# Patient Record
Sex: MALE | Race: WHITE | NOT HISPANIC OR LATINO | Employment: FULL TIME | URBAN - METROPOLITAN AREA
[De-identification: names, ages, dates, MRNs, and addresses within clinical notes are randomized per-mention and may not be internally consistent; named-entity substitution may affect disease eponyms.]

---

## 2017-02-06 ENCOUNTER — HISTORICAL (OUTPATIENT)
Dept: ADMINISTRATIVE | Facility: HOSPITAL | Age: 67
End: 2017-02-06

## 2017-02-06 LAB
ALBUMIN SERPL-MCNC: 4.5 G/DL (ref 3.1–4.7)
ALP SERPL-CCNC: 55 IU/L (ref 40–104)
ALT (SGPT): 58 IU/L (ref 3–33)
AST SERPL-CCNC: 41 IU/L (ref 10–40)
BASOPHILS NFR BLD: 0 K/UL (ref 0–0.2)
BASOPHILS NFR BLD: 0.9 %
BILIRUB SERPL-MCNC: 0.8 MG/DL (ref 0.3–1)
BUN SERPL-MCNC: 12 MG/DL (ref 8–20)
CALCIUM SERPL-MCNC: 9.6 MG/DL (ref 7.7–10.4)
CHLORIDE: 105 MMOL/L (ref 98–110)
CO2 SERPL-SCNC: 28.8 MMOL/L (ref 22.8–31.6)
CREATININE: 0.8 MG/DL (ref 0.6–1.4)
CRP SERPL-MCNC: 0.06 MG/DL (ref 0–1.4)
EOSINOPHIL NFR BLD: 0.2 K/UL (ref 0–0.7)
EOSINOPHIL NFR BLD: 5.3 %
ERYTHROCYTE [DISTWIDTH] IN BLOOD BY AUTOMATED COUNT: 12.5 % (ref 11.7–14.9)
GLUCOSE: 83 MG/DL (ref 70–99)
GRAN #: 2.3 K/UL (ref 1.4–6.5)
GRAN%: 54.2 %
HCT VFR BLD AUTO: 45.3 % (ref 39–55)
HGB BLD-MCNC: 15.5 G/DL (ref 14–16)
IMMATURE GRANS (ABS): 0 K/UL (ref 0–1)
IMMATURE GRANULOCYTES: 0 %
LYMPH #: 1.3 K/UL (ref 1.2–3.4)
LYMPH%: 29.1 %
MCH RBC QN AUTO: 31.6 PG (ref 25–35)
MCHC RBC AUTO-ENTMCNC: 34.2 G/DL (ref 31–36)
MCV RBC AUTO: 92.3 FL (ref 80–100)
MONO #: 0.5 K/UL (ref 0.1–0.6)
MONO%: 10.5 %
NUCLEATED RBCS: 0 %
PLATELET # BLD AUTO: 217 K/UL (ref 140–440)
PMV BLD AUTO: 10 FL (ref 8.8–12.7)
POTASSIUM SERPL-SCNC: 3.9 MMOL/L (ref 3.5–5)
PROT SERPL-MCNC: 8.1 G/DL (ref 6–8.2)
RBC # BLD AUTO: 4.91 M/UL (ref 4.3–5.9)
SODIUM: 142 MMOL/L (ref 134–144)
WBC # BLD AUTO: 4.3 K/UL (ref 5–10)

## 2017-05-08 ENCOUNTER — HISTORICAL (OUTPATIENT)
Dept: ADMINISTRATIVE | Facility: HOSPITAL | Age: 67
End: 2017-05-08

## 2017-05-08 LAB
ALBUMIN SERPL-MCNC: 4.4 G/DL (ref 3.1–4.7)
ALP SERPL-CCNC: 50 IU/L (ref 40–104)
ALT (SGPT): 46 IU/L (ref 3–33)
AST SERPL-CCNC: 31 IU/L (ref 10–40)
BASOPHILS NFR BLD: 0 K/UL (ref 0–0.2)
BASOPHILS NFR BLD: 0.8 %
BILIRUB SERPL-MCNC: 0.8 MG/DL (ref 0.3–1)
BUN SERPL-MCNC: 12 MG/DL (ref 8–20)
CALCIUM SERPL-MCNC: 9.4 MG/DL (ref 7.7–10.4)
CHLORIDE: 107 MMOL/L (ref 98–110)
CO2 SERPL-SCNC: 27.8 MMOL/L (ref 22.8–31.6)
CREATININE: 0.75 MG/DL (ref 0.6–1.4)
CRP SERPL-MCNC: 0.07 MG/DL (ref 0–1.4)
EOSINOPHIL NFR BLD: 0.3 K/UL (ref 0–0.7)
EOSINOPHIL NFR BLD: 6.3 %
ERYTHROCYTE [DISTWIDTH] IN BLOOD BY AUTOMATED COUNT: 13 % (ref 11.7–14.9)
GLUCOSE: 88 MG/DL (ref 70–99)
GRAN #: 3.1 K/UL (ref 1.4–6.5)
GRAN%: 62.5 %
HCT VFR BLD AUTO: 45.5 % (ref 39–55)
HGB BLD-MCNC: 15.5 G/DL (ref 14–16)
IMMATURE GRANS (ABS): 0 K/UL (ref 0–1)
IMMATURE GRANULOCYTES: 0.2 %
LYMPH #: 1 K/UL (ref 1.2–3.4)
LYMPH%: 20.2 %
MCH RBC QN AUTO: 31.8 PG (ref 25–35)
MCHC RBC AUTO-ENTMCNC: 34.1 G/DL (ref 31–36)
MCV RBC AUTO: 93.2 FL (ref 80–100)
MONO #: 0.5 K/UL (ref 0.1–0.6)
MONO%: 10 %
NUCLEATED RBCS: 0 %
PLATELET # BLD AUTO: 230 K/UL (ref 140–440)
PMV BLD AUTO: 9.9 FL (ref 8.8–12.7)
POTASSIUM SERPL-SCNC: 4.2 MMOL/L (ref 3.5–5)
PROT SERPL-MCNC: 8.1 G/DL (ref 6–8.2)
RBC # BLD AUTO: 4.88 M/UL (ref 4.3–5.9)
SODIUM: 143 MMOL/L (ref 134–144)
VITAMIN D, 1,25 (OH)2: 47.1 NG/ML (ref 30–100)
WBC # BLD AUTO: 4.9 K/UL (ref 5–10)

## 2017-05-15 RX ORDER — GOLIMUMAB 50 MG/.5ML
INJECTION, SOLUTION SUBCUTANEOUS
Refills: 2 | OUTPATIENT
Start: 2017-05-15

## 2017-05-24 RX ORDER — FOLIC ACID 1 MG/1
1000 TABLET ORAL DAILY
Refills: 11 | COMMUNITY
Start: 2017-05-08

## 2017-05-24 RX ORDER — METHOTREXATE 2.5 MG/1
TABLET ORAL
Refills: 1 | COMMUNITY
Start: 2017-05-09 | End: 2017-09-11 | Stop reason: SDUPTHER

## 2017-07-10 ENCOUNTER — OFFICE VISIT (OUTPATIENT)
Dept: RHEUMATOLOGY | Facility: CLINIC | Age: 67
End: 2017-07-10
Payer: COMMERCIAL

## 2017-07-10 DIAGNOSIS — M05.9 RHEUMATOID ARTHRITIS WITH POSITIVE RHEUMATOID FACTOR, INVOLVING UNSPECIFIED SITE: Primary | ICD-10-CM

## 2017-07-10 LAB
ALBUMIN SERPL-MCNC: 4.4 G/DL (ref 3.1–4.7)
ALP SERPL-CCNC: 58 IU/L (ref 40–104)
ALT (SGPT): 46 IU/L (ref 3–33)
AST SERPL-CCNC: 33 IU/L (ref 10–40)
BASOPHILS NFR BLD: 0.1 K/UL (ref 0–0.2)
BASOPHILS NFR BLD: 1 %
BILIRUB SERPL-MCNC: 0.8 MG/DL (ref 0.3–1)
BUN SERPL-MCNC: 14 MG/DL (ref 8–20)
CALCIUM SERPL-MCNC: 9.6 MG/DL (ref 7.7–10.4)
CHLORIDE: 105 MMOL/L (ref 98–110)
CO2 SERPL-SCNC: 27.7 MMOL/L (ref 22.8–31.6)
CREATININE: 0.8 MG/DL (ref 0.6–1.4)
CRP SERPL-MCNC: 0.13 MG/DL (ref 0–1.4)
EOSINOPHIL NFR BLD: 0.3 K/UL (ref 0–0.7)
EOSINOPHIL NFR BLD: 6.3 %
ERYTHROCYTE [DISTWIDTH] IN BLOOD BY AUTOMATED COUNT: 13.1 % (ref 11.7–14.9)
GLUCOSE: 92 MG/DL (ref 70–99)
GRAN #: 2.9 K/UL (ref 1.4–6.5)
GRAN%: 57.5 %
HCT VFR BLD AUTO: 46 % (ref 39–55)
HGB BLD-MCNC: 15.9 G/DL (ref 14–16)
IMMATURE GRANS (ABS): 0 K/UL (ref 0–1)
IMMATURE GRANULOCYTES: 0.2 %
LYMPH #: 1.1 K/UL (ref 1.2–3.4)
LYMPH%: 21.5 %
MCH RBC QN AUTO: 31.7 PG (ref 25–35)
MCHC RBC AUTO-ENTMCNC: 34.6 G/DL (ref 31–36)
MCV RBC AUTO: 91.8 FL (ref 80–100)
MONO #: 0.7 K/UL (ref 0.1–0.6)
MONO%: 13.5 %
NUCLEATED RBCS: 0 %
PLATELET # BLD AUTO: 234 K/UL (ref 140–440)
PMV BLD AUTO: 9.9 FL (ref 8.8–12.7)
POTASSIUM SERPL-SCNC: 3.9 MMOL/L (ref 3.5–5)
PROT SERPL-MCNC: 8.4 G/DL (ref 6–8.2)
RBC # BLD AUTO: 5.01 M/UL (ref 4.3–5.9)
SODIUM: 142 MMOL/L (ref 134–144)
WBC # BLD AUTO: 5.1 K/UL (ref 5–10)

## 2017-07-10 PROCEDURE — 99214 OFFICE O/P EST MOD 30 MIN: CPT | Mod: ,,, | Performed by: INTERNAL MEDICINE

## 2017-07-10 PROCEDURE — 1159F MED LIST DOCD IN RCRD: CPT | Mod: ,,, | Performed by: INTERNAL MEDICINE

## 2017-07-10 RX ORDER — PREDNISONE 2.5 MG/1
2.5 TABLET ORAL
Qty: 100 TABLET | Refills: 1 | Status: SHIPPED | OUTPATIENT
Start: 2017-07-10 | End: 2017-11-07 | Stop reason: SDUPTHER

## 2017-07-10 RX ORDER — PREDNISONE 2.5 MG/1
2.5 TABLET ORAL
Qty: 100 TABLET | Refills: 1 | Status: SHIPPED | OUTPATIENT
Start: 2017-07-10 | End: 2017-07-10 | Stop reason: SDUPTHER

## 2017-07-10 NOTE — PROGRESS NOTES
[facility] RHEUMATOLOGY            PROGRESS NOTE      Subjective:       Patient ID:   NAME: Sony Jean : 1950     67 y.o. male    Referring Doc: Self, Aaareferral  Other Physicians:    Chief Complaint:  No chief complaint on file.      History of Present Illness:     Patient returns today for a regularly scheduled follow-up visit for   Rheumatoid arthritis    The patient is not doing well. He is experiencing  prolonged morning stiffness, fatigue and pain and swelling in his wrists MCP joints and PIP joints.  No gastrointestinal complaints. No fevers, cough or shortness of breath, no chest pains            ROS:   GEN:  No  fever, night sweats . weight is stable   some fatigue  SKIN: no rashes, no bruising, no ulcerations, no Raynaud's  HEENT: no HA's, No visual changes, no mucosal ulcers, no sicca symptoms,  CV:   no CP, SOB, PND, PLATT, no orthopnea, no palpitations  PULM: normal with no SOB, cough, hemoptysis, sputum or pleuritic pain  GI:  no abdominal pain, nausea, vomiting, constipation, diarrhea, melanotic stools, BRBPR, hematemesis, no dysphagia  :   no dysuria  NEURO: no paresthesias, headaches, visual disturbances, muscle weakness  MUSCULOSKELETAL:+ joint swelling,prolonged AM stiffness, no back pain, no muscle pain  Allergies:  Review of patient's allergies indicates:  No Known Allergies    Medications:    Current Outpatient Prescriptions:     folic acid (FOLVITE) 1 MG tablet, Take 1,000 mcg by mouth once daily., Disp: , Rfl: 11    golimumab (SIMPONI) 50 mg/0.5 mL Syrg, Once a month, Disp: 3 Syringe, Rfl: 5    methotrexate 2.5 MG Tab, TAKE 5 TABLETS BY MOUTH ONCE WEEKLY, Disp: , Rfl: 1    predniSONE (DELTASONE) 2.5 MG tablet, Take 1 tablet (2.5 mg total) by mouth every meal as needed., Disp: 100 tablet, Rfl: 1    PMHx/PSHx Updates:            Objective:     VSS  Physical Examination:   GEN: no apparent distress, comfortable; AAOx3  SKIN: no rashes,no ulceration, no Raynaud's, no petechiae, no  SQ nodules,  HEAD: normal  EYES: no pallor, no icterus,   ENT:  ,no mucosal dryness or ulcerations  NECK: no masses, thyroid normal, trachea midline, no LAD/LN's, supple  CV: RRR with no murmur; l S1 and S2 reg. ,no gallop no rubs,   CHEST: Normal respiratory effort; CTAB; normal breath sounds; no wheeze or crackles  ABDOM: nontender and nondistended; soft; no masses; no rebound/guarding  MUSC/Skeletal: ROM decreased in MCPs and wrists , joints with synovitis,  + deformities( Ular deviation of the MCPs) +  joint swelling and  tenderness of , MCP, wrist, PIPs  EXTREM: no clubbing, cyanosis, no edema,normal  pulses   NEURO: grossly intact; motor WNL; AAOx3; ,PSYCH: normal mood, affect and behavior  LYMPH: normal cervical, supraclavicular          Labs:   Lab Results   Component Value Date    WBC 4.9 (L) 05/08/2017    HGB 15.5 05/08/2017    HCT 45.5 05/08/2017    MCV 93.2 05/08/2017     05/08/2017    CMP  @LASTLAB(NA,K,CL,CO2,GLU,BUN,Creatinine,Calcium,PROT,Albumin,Bilitot,Alkphos,AST,ALT,CRP,ESR,RF,CCP,BERTRAND,SSA,CPK,uric acid) )@  I have reviewed all available lab results and radiology reports.    Radiology/Diagnostic Studies:        Assessment/Plan:   (1) 67 y.o. male with diagnosis of RA  RA is active  Add prednisone 2.5-5 mg tid pc prn  Increase MTX to 15 mg once a week    Considerations switch infusions he will be covered by Medicare as her primary insurance in a few months. Discussed options            Discussion:     I have explained all of the above in detail and the patient understands all of the current recommendation(s). I have answered all questions to the best of my ability and to their complete satisfaction.       The patient is to continue with the current management plan         RTC in   One month      Electronically signed by Aiyana Carlson MD

## 2017-08-07 ENCOUNTER — OFFICE VISIT (OUTPATIENT)
Dept: RHEUMATOLOGY | Facility: CLINIC | Age: 67
End: 2017-08-07
Payer: COMMERCIAL

## 2017-08-07 VITALS
DIASTOLIC BLOOD PRESSURE: 81 MMHG | SYSTOLIC BLOOD PRESSURE: 123 MMHG | WEIGHT: 191 LBS | HEIGHT: 72 IN | BODY MASS INDEX: 25.87 KG/M2

## 2017-08-07 DIAGNOSIS — M05.79 SEROPOSITIVE RHEUMATOID ARTHRITIS OF MULTIPLE SITES: Primary | ICD-10-CM

## 2017-08-07 PROCEDURE — 99213 OFFICE O/P EST LOW 20 MIN: CPT | Mod: ,,, | Performed by: INTERNAL MEDICINE

## 2017-08-07 PROCEDURE — 1159F MED LIST DOCD IN RCRD: CPT | Mod: ,,, | Performed by: INTERNAL MEDICINE

## 2017-08-07 PROCEDURE — 3008F BODY MASS INDEX DOCD: CPT | Mod: ,,, | Performed by: INTERNAL MEDICINE

## 2017-08-07 NOTE — PROGRESS NOTES
[facility] RHEUMATOLOGY            PROGRESS NOTE      Subjective:       Patient ID:   NAME: Sony Jean : 1950     67 y.o. male    Referring Doc: No ref. provider found  Other Physicians:    Chief Complaint:  Rheumatoid Arthritis (no changes since last visit)      History of Present Illness:     Patient returns today for a regularly scheduled follow-up visit for   RA    The patient feels better but still not great.  Feels fatigued,occ not sleeping well due to some anxiety regarding pen[facility].  No fevers,cough,no SOB  AM stiffness lasting one hr since on prednisone            ROS:   GEN:  No  fever, night sweats . weight is stable   some fatigue  SKIN: no rashes, no bruising, no ulcerations, no Raynaud's  HEENT: no HA's, No visual changes, no mucosal ulcers, no sicca symptoms,  CV:   no CP, SOB, PND, PLATT, no orthopnea, no palpitations  PULM: normal with no SOB, cough, hemoptysis, sputum or pleuritic pain  GI:  no abdominal pain, nausea, vomiting, constipation, diarrhea, melanotic stools, BRBPR, hematemesis, no dysphagia  :   no dysuria  NEURO: no paresthesias, headaches, visual disturbances, muscle weakness  MUSCULOSKELETAL:no joint swelling, prolonged AM stiffness, no back pain, no muscle pain  Allergies:  Review of patient's allergies indicates:  No Known Allergies    Medications:    Current Outpatient Prescriptions:     folic acid (FOLVITE) 1 MG tablet, Take 1,000 mcg by mouth once daily., Disp: , Rfl: 11    golimumab (SIMPONI) 50 mg/0.5 mL Syrg, Once a month, Disp: 3 Syringe, Rfl: 5    methotrexate 2.5 MG Tab, TAKE 5 TABLETS BY MOUTH ONCE WEEKLY, Disp: , Rfl: 1    predniSONE (DELTASONE) 2.5 MG tablet, Take 1 tablet (2.5 mg total) by mouth every meal as needed., Disp: 100 tablet, Rfl: 1    PMHx/PSHx Updates:      Last Eye Exam      Objective:     Vitals:  Blood pressure 123/81, height 6' (1.829 m), weight 86.6 kg (191 lb).    Physical Examination:   GEN: no apparent distress, comfortable;  AAOx3  SKIN: no rashes,no ulceration, no Raynaud's, no petechiae, no SQ nodules,  HEAD: normal  EYES: no pallor, no icterus,  NECK: no masses, thyroid normal, trachea midline, no LAD/LN's, supple  CV: RRR with no murmur; l S1 and S2 reg. ,no gallop no rubs,   CHEST: Normal respiratory effort; CTAB; normal breath sounds; no wheeze or crackles  ABDOM: nontender and nondistended; soft; no masses; no rebound/guarding  MUSC/Skeletal: ROM normal; no crepitus; joints without synovitis,  no deformities  No joint swelling or tenderness of PIP, MCP, wrist, elbow, shoulder, or knee joints  EXTREM: no clubbing, cyanosis, no edema,normal  pulses   NEURO: grossly intact; motor WNL; AAOx  LYMPH: normal cervical, supraclavicular          Labs:   Lab Results   Component Value Date    WBC 5.1 07/10/2017    HGB 15.9 07/10/2017    HCT 46.0 07/10/2017    MCV 91.8 07/10/2017     07/10/2017    CMP  @LASTLAB(NA,K,CL,CO2,GLU,BUN,Creatinine,Calcium,PROT,Albumin,Bilitot,Alkphos,AST,ALT,CRP,ESR,RF,CCP,BERTRAND,SSA,CPK,uric acid) )@  I have reviewed all available lab results and radiology reports.    Radiology/Diagnostic Studies:        Assessment/Plan:   (1) 67 y.o. male with diagnosis of Rheumatoid arthritis. He is better but not great. His insurance will change to plain Medicare so we might need to switch him to IV biologic  Had blood work done last week CBC CMP and C-reactive protein were within normal range.                Discussion:     I have explained all of the above in detail and the patient understands all of the current recommendation(s). I have answered all questions to the best of my ability and to their complete satisfaction.       The patient is to continue with the current management plan         RTC in   3 months      Electronically signed by Aiyana Carlson MD

## 2017-09-11 RX ORDER — METHOTREXATE 2.5 MG/1
TABLET ORAL
Qty: 48 TABLET | Refills: 1 | Status: SHIPPED | OUTPATIENT
Start: 2017-09-11 | End: 2017-09-20 | Stop reason: DRUGHIGH

## 2017-09-20 RX ORDER — METHOTREXATE 2.5 MG/1
15 TABLET ORAL
COMMUNITY
End: 2017-11-07 | Stop reason: SDUPTHER

## 2017-11-07 ENCOUNTER — OFFICE VISIT (OUTPATIENT)
Dept: RHEUMATOLOGY | Facility: CLINIC | Age: 67
End: 2017-11-07
Payer: MEDICARE

## 2017-11-07 VITALS
BODY MASS INDEX: 26.14 KG/M2 | DIASTOLIC BLOOD PRESSURE: 96 MMHG | WEIGHT: 193 LBS | HEIGHT: 72 IN | SYSTOLIC BLOOD PRESSURE: 168 MMHG

## 2017-11-07 DIAGNOSIS — M05.79 RHEUMATOID ARTHRITIS INVOLVING MULTIPLE SITES WITH POSITIVE RHEUMATOID FACTOR: Primary | ICD-10-CM

## 2017-11-07 DIAGNOSIS — M15.9 OSTEOARTHRITIS, GENERALIZED: ICD-10-CM

## 2017-11-07 LAB
ALBUMIN SERPL-MCNC: 4.1 G/DL (ref 3.1–4.7)
ALP SERPL-CCNC: 56 IU/L (ref 40–104)
ALT (SGPT): 33 IU/L (ref 3–33)
AST SERPL-CCNC: 22 IU/L (ref 10–40)
BASOPHILS NFR BLD: 0.1 K/UL (ref 0–0.2)
BASOPHILS NFR BLD: 0.7 %
BILIRUB SERPL-MCNC: 0.8 MG/DL (ref 0.3–1)
BUN SERPL-MCNC: 17 MG/DL (ref 8–20)
CALCIUM SERPL-MCNC: 9.6 MG/DL (ref 7.7–10.4)
CHLORIDE: 104 MMOL/L (ref 98–110)
CO2 SERPL-SCNC: 32.1 MMOL/L (ref 22.8–31.6)
CREATININE: 0.81 MG/DL (ref 0.6–1.4)
CRP SERPL-MCNC: 0.15 MG/DL (ref 0–1.4)
EOSINOPHIL NFR BLD: 0.2 K/UL (ref 0–0.7)
EOSINOPHIL NFR BLD: 1.7 %
ERYTHROCYTE [DISTWIDTH] IN BLOOD BY AUTOMATED COUNT: 13.4 % (ref 11.7–14.9)
GLUCOSE: 122 MG/DL (ref 70–99)
GRAN #: 6.8 K/UL (ref 1.4–6.5)
GRAN%: 79.2 %
HCT VFR BLD AUTO: 46.7 % (ref 39–55)
HGB BLD-MCNC: 15.8 G/DL (ref 14–16)
IMMATURE GRANS (ABS): 0 K/UL (ref 0–1)
IMMATURE GRANULOCYTES: 0.3 %
LYMPH #: 0.9 K/UL (ref 1.2–3.4)
LYMPH%: 10.9 %
MCH RBC QN AUTO: 31.9 PG (ref 25–35)
MCHC RBC AUTO-ENTMCNC: 33.8 G/DL (ref 31–36)
MCV RBC AUTO: 94.3 FL (ref 80–100)
MONO #: 0.6 K/UL (ref 0.1–0.6)
MONO%: 7.2 %
NUCLEATED RBCS: 0 %
PLATELET # BLD AUTO: 251 K/UL (ref 140–440)
PMV BLD AUTO: 9.7 FL (ref 8.8–12.7)
POTASSIUM SERPL-SCNC: 3.7 MMOL/L (ref 3.5–5)
PROT SERPL-MCNC: 7.8 G/DL (ref 6–8.2)
RBC # BLD AUTO: 4.95 M/UL (ref 4.3–5.9)
SODIUM: 144 MMOL/L (ref 134–144)
WBC # BLD AUTO: 8.6 K/UL (ref 5–10)

## 2017-11-07 PROCEDURE — 99214 OFFICE O/P EST MOD 30 MIN: CPT | Mod: ,,, | Performed by: INTERNAL MEDICINE

## 2017-11-07 RX ORDER — METHOTREXATE 2.5 MG/1
TABLET ORAL
Qty: 32 TABLET | Refills: 2 | Status: SHIPPED | OUTPATIENT
Start: 2017-11-07 | End: 2017-12-04 | Stop reason: SDUPTHER

## 2017-11-07 RX ORDER — PREDNISONE 2.5 MG/1
2.5 TABLET ORAL
Qty: 100 TABLET | Refills: 1 | Status: SHIPPED | OUTPATIENT
Start: 2017-11-07 | End: 2018-01-11 | Stop reason: SDUPTHER

## 2017-11-07 NOTE — PROGRESS NOTES
[facility] RHEUMATOLOGY            PROGRESS NOTE      Subjective:       Patient ID:   NAME: Sony Jean : 1950     67 y.o. male    Referring Doc: No ref. provider found  Other Physicians:    Chief Complaint:  No chief complaint on file.                                Rheumatoid arthritis    History of Present Illness:     Patient returns today for a regularly scheduled follow-up visit forRheumatoid arthritis       The patient continues to experience prolonged morning stiffness and fatigue. No fevers no cough or shortness of breath. No gastrointestinal complaints. He is having pain and swelling in his wrists MCPs and PIP joints            ROS:   GEN:  No  fever, night sweats . weight is stable   some fatigue  SKIN: no rashes, no bruising, no ulcerations, no Raynaud's  HEENT: no HA's, No visual changes, no mucosal ulcers, no sicca symptoms,  CV:   no CP, SOB, PND, PLATT, no orthopnea, no palpitations  PULM: normal with no SOB, cough, hemoptysis, sputum or pleuritic pain  GI:  no abdominal pain, nausea, vomiting, constipation, diarrhea, melanotic stools, BRBPR, hematemesis, no dysphagia  :   no dysuria  NEURO: no paresthesias, headaches, visual disturbances, muscle weakness  MUSCULOSKELETAL:+ joint swelling,+ prolonged AM stiffness, no back pain, no muscle pain  Allergies:  Review of patient's allergies indicates:  No Known Allergies    Medications:    Current Outpatient Prescriptions:     folic acid (FOLVITE) 1 MG tablet, Take 1,000 mcg by mouth once daily., Disp: , Rfl: 11    golimumab (SIMPONI) 50 mg/0.5 mL Syrg, Once a month, Disp: 3 Syringe, Rfl: 5    methotrexate 2.5 MG Tab, 8 pills once a week, Disp: 32 tablet, Rfl: 2    predniSONE (DELTASONE) 2.5 MG tablet, Take 1 tablet (2.5 mg total) by mouth every meal as needed., Disp: 100 tablet, Rfl: 1    PMHx/PSHx Updates:      Objective:     Vitals:  Blood pressure (!) 168/96, height 6' (1.829 m), weight 87.5 kg (193 lb).    Physical Examination:   GEN: no  apparent distress, comfortable; AAOx3  SKIN: no rashes,no ulceration, no Raynaud's, no petechiae, no SQ nodules,  HEAD: normal  EYES: no pallor, no icterus,   NECK: no masses, thyroid normal, trachea midline, no LAD/LN's, supple  CV: RRR with no murmur; l S1 and S2 reg. ,no gallop no rubs,   CHEST: Normal respiratory effort; CTAB; normal breath sounds; no wheeze or crackles  MUSC/Skeletal: ROM normal; no crepitus; joints with  Synovitis wrists ,MCPs and PIPs   No joint swelling or tenderness of elbow, shoulder, or knee joints  EXTREM: no clubbing, cyanosis, no edema,normal  pulses   NEURO: grossly intact; motor WNL; AAOx3; ,  PSYCH: normal mood, affect and behavior  LYMPH: normal cervical, supraclavicular          Labs:   Lab Results   Component Value Date    WBC 5.1 07/10/2017    HGB 15.9 07/10/2017    HCT 46.0 07/10/2017    MCV 91.8 07/10/2017     07/10/2017    CMP  @LASTLAB(NA,K,CL,CO2,GLU,BUN,Creatinine,Calcium,PROT,Albumin,Bilitot,Alkphos,AST,ALT,CRP,ESR,RF,CCP,BERTRAND,SSA,CPK,uric acid) )@  I have reviewed all available lab results and radiology reports.    Radiology/Diagnostic Studies:        Assessment/Plan:   (1) 67 y.o. male with diagnosis of Rheumatoid arthritis. This is not well controlled with Simponi  He has failed Enbrel, Actemra,Orencia,  Will try Rituxan infusions  His blood pressure is slightly elevated. He will monitor at home and contact primary care physician for treatment if persistently elevated  Crease methotrexate to 20 mg a week.    CBC CMP CRP      Discussion:     I have explained all of the above in detail and the patient understands all of the current recommendation(s). I have answered all questions to the best of my ability and to their complete satisfaction.       The patient is to continue with the current management plan         RTC in  1 month       Electronically signed by Aiyana Carlson MD

## 2017-12-04 ENCOUNTER — OFFICE VISIT (OUTPATIENT)
Dept: RHEUMATOLOGY | Facility: CLINIC | Age: 67
End: 2017-12-04
Payer: MEDICARE

## 2017-12-04 VITALS — HEIGHT: 72 IN | SYSTOLIC BLOOD PRESSURE: 170 MMHG | DIASTOLIC BLOOD PRESSURE: 98 MMHG

## 2017-12-04 DIAGNOSIS — M05.79 RHEUMATOID ARTHRITIS INVOLVING MULTIPLE SITES WITH POSITIVE RHEUMATOID FACTOR: Primary | ICD-10-CM

## 2017-12-04 LAB
ALBUMIN SERPL-MCNC: 4.2 G/DL (ref 3.1–4.7)
ALP SERPL-CCNC: 59 IU/L (ref 40–104)
ALT (SGPT): 38 IU/L (ref 3–33)
AST SERPL-CCNC: 26 IU/L (ref 10–40)
BASOPHILS NFR BLD: 0.1 K/UL (ref 0–0.2)
BASOPHILS NFR BLD: 0.7 %
BILIRUB SERPL-MCNC: 0.9 MG/DL (ref 0.3–1)
BUN SERPL-MCNC: 17 MG/DL (ref 8–20)
CALCIUM SERPL-MCNC: 9.4 MG/DL (ref 7.7–10.4)
CHLORIDE: 102 MMOL/L (ref 98–110)
CO2 SERPL-SCNC: 29.5 MMOL/L (ref 22.8–31.6)
CREATININE: 0.73 MG/DL (ref 0.6–1.4)
CRP SERPL-MCNC: 0.14 MG/DL (ref 0–1.4)
EOSINOPHIL NFR BLD: 0.1 K/UL (ref 0–0.7)
EOSINOPHIL NFR BLD: 1.5 %
ERYTHROCYTE [DISTWIDTH] IN BLOOD BY AUTOMATED COUNT: 13.1 % (ref 11.7–14.9)
GLUCOSE: 86 MG/DL (ref 70–99)
GRAN #: 5.4 K/UL (ref 1.4–6.5)
GRAN%: 75.5 %
HCT VFR BLD AUTO: 45.8 % (ref 39–55)
HGB BLD-MCNC: 15.9 G/DL (ref 14–16)
IMMATURE GRANS (ABS): 0 K/UL (ref 0–1)
IMMATURE GRANULOCYTES: 0.4 %
LYMPH #: 0.8 K/UL (ref 1.2–3.4)
LYMPH%: 11.3 %
MCH RBC QN AUTO: 32.2 PG (ref 25–35)
MCHC RBC AUTO-ENTMCNC: 34.7 G/DL (ref 31–36)
MCV RBC AUTO: 92.7 FL (ref 80–100)
MONO #: 0.8 K/UL (ref 0.1–0.6)
MONO%: 10.6 %
NUCLEATED RBCS: 0 %
PLATELET # BLD AUTO: 257 K/UL (ref 140–440)
PMV BLD AUTO: 9.4 FL (ref 8.8–12.7)
POTASSIUM SERPL-SCNC: 3.5 MMOL/L (ref 3.5–5)
PROT SERPL-MCNC: 8.1 G/DL (ref 6–8.2)
RBC # BLD AUTO: 4.94 M/UL (ref 4.3–5.9)
SODIUM: 139 MMOL/L (ref 134–144)
WBC # BLD AUTO: 7.2 K/UL (ref 5–10)

## 2017-12-04 PROCEDURE — 99213 OFFICE O/P EST LOW 20 MIN: CPT | Mod: ,,, | Performed by: INTERNAL MEDICINE

## 2017-12-04 RX ORDER — METHOTREXATE 2.5 MG/1
TABLET ORAL
Qty: 32 TABLET | Refills: 2 | Status: SHIPPED | OUTPATIENT
Start: 2017-12-04 | End: 2018-01-11 | Stop reason: SDUPTHER

## 2017-12-04 RX ORDER — HYDROCHLOROTHIAZIDE 25 MG/1
25 TABLET ORAL DAILY
Qty: 30 TABLET | Refills: 2 | Status: SHIPPED | OUTPATIENT
Start: 2017-12-04 | End: 2018-12-04

## 2017-12-04 RX ORDER — TAZAROTENE 1 MG/G
CREAM TOPICAL
COMMUNITY
Start: 2017-11-22

## 2017-12-04 NOTE — PROGRESS NOTES
[facility] RHEUMATOLOGY            PROGRESS NOTE      Subjective:       Patient ID:   NAME: Sony Jean : 1950     67 y.o. male    Referring Doc: No ref. provider found  Other Physicians:    Chief Complaint:  No chief complaint on file.      History of Present Illness:     Patient returns today for a regularly scheduled follow-up visit for   RA    The patient is doing better since starting prednisone 10 mg a day  No fevers,no cough, no SOB  No GI sxs.  No fevers  Latest joint swelling and pain.          ROS:   GEN:  No  fever, night sweats . weight is stable   No fatigue  SKIN: no rashes, no bruising, no ulcerations, no Raynaud's  HEENT: no HA's, No visual changes, no mucosal ulcers, no sicca symptoms,  CV:   no CP, SOB, PND, PLATT, no orthopnea, no palpitations  PULM: normal with no SOB, cough, hemoptysis, sputum or pleuritic pain  GI:  no abdominal pain, nausea, vomiting, constipation, diarrhea, melanotic stools, BRBPR, hematemesis, no dysphagia  :   no dysuria  NEURO: no paresthesias, headaches, visual disturbances, muscle weakness  MUSCULOSKELETAL:no joint swelling, prolonged AM stiffness, no back pain, no muscle pain  Allergies:  Review of patient's allergies indicates:  No Known Allergies    Medications:    Current Outpatient Prescriptions:     folic acid (FOLVITE) 1 MG tablet, Take 1,000 mcg by mouth once daily., Disp: , Rfl: 11    golimumab (SIMPONI) 50 mg/0.5 mL Syrg, Once a month, Disp: 3 Syringe, Rfl: 5    methotrexate 2.5 MG Tab, 8 pills once a week, Disp: 32 tablet, Rfl: 2    predniSONE (DELTASONE) 2.5 MG tablet, Take 1 tablet (2.5 mg total) by mouth every meal as needed., Disp: 100 tablet, Rfl: 1    tazarotene (AVAGE) 0.1 % cream, , Disp: , Rfl:     PMHx/PSHx Updates:      Objective:     Vitals:  Blood pressure (!) 170/98, height 6' (1.829 m).    Physical Examination:   GEN: no apparent distress, comfortable; AAOx3  SKIN: no rashes,no ulceration, no Raynaud's, no petechiae, no SQ  nodules,  HEAD: normal  EYES: no pallor, no icterus,  NECK: no masses, thyroid normal, trachea midline, no LAD/LN's, supple  CV: RRR with no murmur; l S1 and S2 reg. ,no gallop no rubs,   CHEST: Normal respiratory effort; CTAB; normal breath sounds; no wheeze or crackle  MUSC/Skeletal: ROM normal; no crepitus; joints without synovitis,  no deformities  No joint swelling or tenderness of PIP, MCP, wrist, elbow, shoulder, or knee joints  EXTREM: no clubbing, cyanosis, no edema,normal  pulses   NEURO: grossly intact; motor WNL; AAOx3; ,  PSYCH: normal mood, affect and behavior  LYMPH: normal cervical, supraclavicular          Labs:   Lab Results   Component Value Date    WBC 8.6 11/07/2017    HGB 15.8 11/07/2017    HCT 46.7 11/07/2017    MCV 94.3 11/07/2017     11/07/2017    CMP  @LASTLAB(NA,K,CL,CO2,GLU,BUN,Creatinine,Calcium,PROT,Albumin,Bilitot,Alkphos,AST,ALT,CRP,ESR,RF,CCP,BERTRAND,SSA,CPK,uric acid) )@  I have reviewed all available lab results and radiology reports.    Radiology/Diagnostic Studies:        Assessment/Plan:   (1) 67 y.o. male with diagnosis of Rheumatoid arthritis. He is better since starting prednisone 10 mg a day. We are waiting for approval of Rituxan infusions.  2) elevated blood pressure most likely from prednisone.  He is advised to see primary care physician but in the meantime I'll start him on hydrochlorothiazide 25 mg a day. He is to monitor his blood pressure at home and contact me or primary care physician if still elevated.  CBC, CMP ,CRP  Repeat CMP in 2 weeks              Discussion:     I have explained all of the above in detail and the patient understands all of the current recommendation(s). I have answered all questions to the best of my ability and to their complete satisfaction.       The patient is to continue with the current management plan         RTC in   4 weeks      Electronically signed by Aiyana Carlson MD

## 2018-01-11 ENCOUNTER — OFFICE VISIT (OUTPATIENT)
Dept: RHEUMATOLOGY | Facility: CLINIC | Age: 68
End: 2018-01-11
Payer: MEDICARE

## 2018-01-11 VITALS
WEIGHT: 186.19 LBS | BODY MASS INDEX: 25.25 KG/M2 | DIASTOLIC BLOOD PRESSURE: 84 MMHG | SYSTOLIC BLOOD PRESSURE: 132 MMHG

## 2018-01-11 DIAGNOSIS — M75.51 BURSITIS OF SHOULDER, RIGHT: ICD-10-CM

## 2018-01-11 DIAGNOSIS — M05.79 RHEUMATOID ARTHRITIS INVOLVING MULTIPLE SITES WITH POSITIVE RHEUMATOID FACTOR: Primary | ICD-10-CM

## 2018-01-11 DIAGNOSIS — M15.9 OSTEOARTHRITIS, GENERALIZED: ICD-10-CM

## 2018-01-11 PROCEDURE — 99213 OFFICE O/P EST LOW 20 MIN: CPT | Mod: ,,, | Performed by: INTERNAL MEDICINE

## 2018-01-11 RX ORDER — METHOTREXATE 2.5 MG/1
TABLET ORAL
Qty: 32 TABLET | Refills: 2 | Status: SHIPPED | OUTPATIENT
Start: 2018-01-11 | End: 2018-03-29 | Stop reason: SDUPTHER

## 2018-01-11 RX ORDER — PREDNISONE 2.5 MG/1
2.5 TABLET ORAL
Qty: 100 TABLET | Refills: 1 | Status: SHIPPED | OUTPATIENT
Start: 2018-01-11

## 2018-01-11 NOTE — PROGRESS NOTES
[facility] RHEUMATOLOGY            PROGRESS NOTE      Subjective:       Patient ID:   NAME: Sony Jean : 1950     67 y.o. male    Referring Doc: No ref. provider found  Other Physicians:    Chief Complaint:  Rheumatoid Arthritis      History of Present Illness:     Patient returns today for a regularly scheduled follow-up visit for RA      The patient continues with arthralgias in his shoulders and MCP joints and PIP joints. Morning stiffness is/since he has been taking prednisone. No fevers cough or shortness of breath. He is in the process of moving to [place].  No chest pains. No gastrointestinal complaints.          ROS:   GEN:  No  fever, night sweats . weight is stable   No fatigue  SKIN: no rashes, no bruising, no ulcerations, no Raynaud's  HEENT: no HA's, No visual changes, no mucosal ulcers, no sicca symptoms,  CV:   no CP, SOB, PND, PLATT, no orthopnea, no palpitations  PULM: normal with no SOB, cough, hemoptysis, sputum or pleuritic pain  GI:  no abdominal pain, nausea, vomiting, constipation, diarrhea, melanotic stools, BRBPR, hematemesis, no dysphagia  :   no dysuria  NEURO: no paresthesias, headaches, visual disturbances, muscle weakness  MUSCULOSKELETAL:no joint swelling, prolonged AM stiffness, no back pain, no muscle pain  Allergies:  Review of patient's allergies indicates:  No Known Allergies    Medications:    Current Outpatient Prescriptions:     folic acid (FOLVITE) 1 MG tablet, Take 1,000 mcg by mouth once daily., Disp: , Rfl: 11    golimumab (SIMPONI) 50 mg/0.5 mL Syrg, Once a month, Disp: 3 Syringe, Rfl: 5    hydroCHLOROthiazide (HYDRODIURIL) 25 MG tablet, Take 1 tablet (25 mg total) by mouth once daily., Disp: 30 tablet, Rfl: 2    methotrexate 2.5 MG Tab, 8 pills once a week, Disp: 32 tablet, Rfl: 2    predniSONE (DELTASONE) 2.5 MG tablet, Take 1 tablet (2.5 mg total) by mouth every meal as needed., Disp: 100 tablet, Rfl: 1    tazarotene (AVAGE) 0.1 % cream, , Disp: , Rfl:      PMHx/PSHx Updates:      Objective:     Vitals:  Blood pressure 132/84, weight 84.5 kg (186 lb 3.2 oz).    Physical Examination:   GEN: no apparent distress, comfortable; AAOx3  SKIN: no rashes,no ulceration, no Raynaud's, no petechiae, no SQ nodules,  HEAD: normal  EYES: no pallor, no icterus,  NECK: no masses, thyroid normal, trachea midline, no LAD/LN's, supple  CV: RRR with no murmur; l S1 and S2 reg. ,no gallop no rubs,   CHEST: Normal respiratory effort; CTAB; normal breath sounds; no wheeze or crackles  MUSC/Skeletal: ROM normal; no crepitus; joints with mild synovitis MCPs and PIP joints,  no deformities  No joint swelling or tenderness , wrist, elbow, shoulder, or knee joints decreased range of motion of both shoulders.  EXTREM: no clubbing, cyanosis, no edema,normal  pulses   NEURO: grossly intact; motor WNL; AAOx3; ,  PSYCH: normal mood, affect and behavior  LYMPH: normal cervical, supraclavicular          Labs:   Lab Results   Component Value Date    WBC 7.2 12/04/2017    HGB 15.9 12/04/2017    HCT 45.8 12/04/2017    MCV 92.7 12/04/2017     12/04/2017    CMP  @LASTLAB(NA,K,CL,CO2,GLU,BUN,Creatinine,Calcium,PROT,Albumin,Bilitot,Alkphos,AST,ALT,CRP,ESR,RF,CCP,BERTRAND,SSA,CPK,uric acid) )@  I have reviewed all available lab results and radiology reports.    Radiology/Diagnostic Studies:  CBC CMP from last month within normal range      Assessment/Plan:   (1) 67 y.o. male with diagnosis ofRheumatoid arthritis. Waiting for financial help to start Rituxan.  .Mild pancho subacromial bursitis.(He declines injection today as he will be doing a lot of lifting and starting tomorrow with his move)                Discussion:     I have explained all of the above in detail and the patient understands all of the current recommendation(s). I have answered all questions to the best of my ability and to their complete satisfaction.       The patient is to continue with the current management plan         RTC in 2-3  months        Electronically signed by Aiyana Carlson MD

## 2018-01-15 RX ORDER — PREDNISONE 2.5 MG/1
2.5 TABLET ORAL
Qty: 100 TABLET | Refills: 0 | Status: SHIPPED | OUTPATIENT
Start: 2018-01-15

## 2018-03-29 DIAGNOSIS — M05.79 RHEUMATOID ARTHRITIS INVOLVING MULTIPLE SITES WITH POSITIVE RHEUMATOID FACTOR: Primary | ICD-10-CM

## 2018-03-29 RX ORDER — METHOTREXATE 2.5 MG/1
TABLET ORAL
Qty: 32 TABLET | Refills: 2 | Status: SHIPPED | OUTPATIENT
Start: 2018-03-29

## 2018-03-29 RX ORDER — METHOTREXATE 2.5 MG/1
TABLET ORAL
Qty: 32 TABLET | Refills: 2 | Status: SHIPPED | OUTPATIENT
Start: 2018-03-29 | End: 2018-05-21 | Stop reason: SDUPTHER

## 2018-04-03 RX ORDER — PREDNISONE 2.5 MG/1
2.5 TABLET ORAL
Qty: 100 TABLET | Refills: 1 | Status: SHIPPED | OUTPATIENT
Start: 2018-04-03

## 2018-04-09 ENCOUNTER — OFFICE VISIT (OUTPATIENT)
Dept: RHEUMATOLOGY | Facility: CLINIC | Age: 68
End: 2018-04-09
Payer: MEDICARE

## 2018-04-09 VITALS — DIASTOLIC BLOOD PRESSURE: 98 MMHG | WEIGHT: 183.63 LBS | SYSTOLIC BLOOD PRESSURE: 138 MMHG | BODY MASS INDEX: 24.9 KG/M2

## 2018-04-09 DIAGNOSIS — M05.79 RHEUMATOID ARTHRITIS INVOLVING MULTIPLE SITES WITH POSITIVE RHEUMATOID FACTOR: Primary | ICD-10-CM

## 2018-04-09 LAB
ALBUMIN SERPL-MCNC: 4.2 G/DL (ref 3.1–4.7)
ALP SERPL-CCNC: 66 IU/L (ref 40–104)
ALT (SGPT): 30 IU/L (ref 3–33)
AST SERPL-CCNC: 25 IU/L (ref 10–40)
BASOPHILS NFR BLD: 0 K/UL (ref 0–0.2)
BASOPHILS NFR BLD: 0.6 %
BILIRUB SERPL-MCNC: 0.9 MG/DL (ref 0.3–1)
BUN SERPL-MCNC: 14 MG/DL (ref 8–20)
CALCIUM SERPL-MCNC: 9.4 MG/DL (ref 7.7–10.4)
CHLORIDE: 104 MMOL/L (ref 98–110)
CO2 SERPL-SCNC: 27.1 MMOL/L (ref 22.8–31.6)
CREATININE: 0.82 MG/DL (ref 0.6–1.4)
CRP SERPL-MCNC: 0.28 MG/DL (ref 0–1.4)
EOSINOPHIL NFR BLD: 0.2 K/UL (ref 0–0.7)
EOSINOPHIL NFR BLD: 2.2 %
ERYTHROCYTE [DISTWIDTH] IN BLOOD BY AUTOMATED COUNT: 13.7 % (ref 11.7–14.9)
GLUCOSE: 84 MG/DL (ref 70–99)
GRAN #: 4.8 K/UL (ref 1.4–6.5)
GRAN%: 71.2 %
HCT VFR BLD AUTO: 45.5 % (ref 39–55)
HGB BLD-MCNC: 15.2 G/DL (ref 14–16)
IMMATURE GRANS (ABS): 0 K/UL (ref 0–1)
IMMATURE GRANULOCYTES: 0.4 %
LYMPH #: 0.9 K/UL (ref 1.2–3.4)
LYMPH%: 13.9 %
MCH RBC QN AUTO: 31.5 PG (ref 25–35)
MCHC RBC AUTO-ENTMCNC: 33.4 G/DL (ref 31–36)
MCV RBC AUTO: 94.2 FL (ref 80–100)
MONO #: 0.8 K/UL (ref 0.1–0.6)
MONO%: 11.7 %
NUCLEATED RBCS: 0 %
PLATELET # BLD AUTO: 265 K/UL (ref 140–440)
PMV BLD AUTO: 9.5 FL (ref 8.8–12.7)
POTASSIUM SERPL-SCNC: 3.9 MMOL/L (ref 3.5–5)
PROT SERPL-MCNC: 8.2 G/DL (ref 6–8.2)
RBC # BLD AUTO: 4.83 M/UL (ref 4.3–5.9)
SODIUM: 140 MMOL/L (ref 134–144)
WBC # BLD AUTO: 6.7 K/UL (ref 5–10)

## 2018-04-09 PROCEDURE — 99213 OFFICE O/P EST LOW 20 MIN: CPT | Mod: ,,, | Performed by: INTERNAL MEDICINE

## 2018-04-09 NOTE — PROGRESS NOTES
[facility] RHEUMATOLOGY            PROGRESS NOTE      Subjective:       Patient ID:   NAME: Sony Jean : 1950     67 y.o. male    Referring Doc: No ref. provider found  Other Physicians:    Chief Complaint:  Rheumatoid Arthritis      History of Present Illness:     Patient returns today for a regularly scheduled follow-up visit for  Rheumatoid arthritis     The patient since he has with arthralgias and shoulder joints and swelling on the right third PIP joint. Some pain on both wrists with activity.  No chest pains cough or shortness of breath.          ROS:   GEN:  No  fever, night sweats . weight is stable   + fatigue  SKIN: no rashes, no bruising, no ulcerations, no Raynaud's  HEENT: no HA's, No visual changes, no mucosal ulcers, no sicca symptoms,  CV:   no CP, SOB, PND, PLATT, no orthopnea, no palpitations  PULM: normal with no SOB, cough, hemoptysis, sputum or pleuritic pain  GI:  no abdominal pain, nausea, vomiting, constipation, diarrhea, melanotic stools, BRBPR, hematemesis, no dysphagia  :   no dysuria  NEURO: no paresthesias, headaches, visual disturbances, muscle weakness  MUSCULOSKELETAL:no joint swelling, prolonged AM stiffness, no back pain, no muscle pain  Allergies:  Review of patient's allergies indicates:  No Known Allergies    Medications:    Current Outpatient Prescriptions:     folic acid (FOLVITE) 1 MG tablet, Take 1,000 mcg by mouth once daily., Disp: , Rfl: 11    golimumab (SIMPONI) 50 mg/0.5 mL Syrg, Once a month, Disp: 3 Syringe, Rfl: 5    hydroCHLOROthiazide (HYDRODIURIL) 25 MG tablet, Take 1 tablet (25 mg total) by mouth once daily., Disp: 30 tablet, Rfl: 2    methotrexate 2.5 MG Tab, TAKE 8 TABLETS BY MOUTH ONCE WEEKLY, Disp: 32 tablet, Rfl: 2    methotrexate 2.5 MG Tab, 8 pills once a week, Disp: 32 tablet, Rfl: 2    predniSONE (DELTASONE) 2.5 MG tablet, Take 1 tablet (2.5 mg total) by mouth every meal as needed., Disp: 100 tablet, Rfl: 1    predniSONE (DELTASONE) 2.5  MG tablet, TAKE 1 TABLET (2.5 MG TOTAL) BY MOUTH EVERY MEAL AS NEEDED., Disp: 100 tablet, Rfl: 0    predniSONE (DELTASONE) 2.5 MG tablet, TAKE 1 TABLET (2.5 MG TOTAL) BY MOUTH EVERY MEAL AS NEEDED, Disp: 100 tablet, Rfl: 1    tazarotene (AVAGE) 0.1 % cream, , Disp: , Rfl:     PMHx/PSHx Updates:        Objective:     Vitals:  Blood pressure (!) 138/98, weight 83.3 kg (183 lb 9.6 oz).    Physical Examination:   GEN: no apparent distress, comfortable; AAOx3  SKIN: no rashes,no ulceration, no Raynaud's, no petechiae, no SQ nodules,  HEAD: normal  EYES: no pallor, no icterus,   NECK: no masses, thyroid normal, trachea midline, no LAD/LN's, supple  CV: RRR with no murmur; l S1 and S2 reg. ,no gallop no rubs,   CHEST: Normal respiratory effort; CTAB; normal breath sounds; no wheeze or crackles  MUSC/Skeletal: ROM normal; no crepitus; joints without synovitis,  + hand  deformities  No joint swelling except right third PIP joint. Tenderness on  full range of motion in both wrists. Decreased range of motion of shoulders. No swelling of shoulders or tenderness of  elbow, or knee joints  EXTREM: no clubbing, cyanosis, no edema,normal  pulses   NEURO: grossly intact; motor WNL; AAOx3; ,  PSYCH: normal mood, affect and behavior  LYMPH: normal cervical, supraclavicular          Labs:   Lab Results   Component Value Date    WBC 7.2 12/04/2017    HGB 15.9 12/04/2017    HCT 45.8 12/04/2017    MCV 92.7 12/04/2017     12/04/2017    CMP  @LASTLAB(NA,K,CL,CO2,GLU,BUN,Creatinine,Calcium,PROT,Albumin,Bilitot,Alkphos,AST,ALT,CRP,ESR,RF,CCP,BERTRAND,SSA,CPK,uric acid) )@  I have reviewed all available lab results and radiology reports.    Radiology/Diagnostic Studies:        Assessment/Plan:   (1) 67 y.o. male with diagnosis of Rheumatoid arthritis. Insurance denied Rituxan. We'll try and see if they approve Remicade or Actemra     CBC CMP CRP            Discussion:     I have explained all of the above in detail and the patient  understands all of the current recommendation(s). I have answered all questions to the best of my ability and to their complete satisfaction.       The patient is to continue with the current management plan         RTC in   3 months      Electronically signed by Aiyana Carlson MD

## 2018-05-21 DIAGNOSIS — M05.79 RHEUMATOID ARTHRITIS INVOLVING MULTIPLE SITES WITH POSITIVE RHEUMATOID FACTOR: ICD-10-CM

## 2018-05-21 RX ORDER — METHOTREXATE 2.5 MG/1
TABLET ORAL
Qty: 32 TABLET | Refills: 2 | Status: SHIPPED | OUTPATIENT
Start: 2018-05-21

## 2018-06-13 RX ORDER — GOLIMUMAB 50 MG/.5ML
INJECTION, SOLUTION SUBCUTANEOUS
Qty: 1 SYRINGE | Refills: 4 | Status: SHIPPED | OUTPATIENT
Start: 2018-06-13

## 2018-08-13 RX ORDER — METHOTREXATE 2.5 MG/1
TABLET ORAL
Qty: 32 TABLET | Refills: 2 | Status: SHIPPED | OUTPATIENT
Start: 2018-08-13